# Patient Record
Sex: MALE | Race: WHITE | NOT HISPANIC OR LATINO
[De-identification: names, ages, dates, MRNs, and addresses within clinical notes are randomized per-mention and may not be internally consistent; named-entity substitution may affect disease eponyms.]

---

## 2017-03-16 ENCOUNTER — APPOINTMENT (OUTPATIENT)
Dept: ELECTROPHYSIOLOGY | Facility: CLINIC | Age: 82
End: 2017-03-16

## 2017-06-20 ENCOUNTER — APPOINTMENT (OUTPATIENT)
Dept: ELECTROPHYSIOLOGY | Facility: CLINIC | Age: 82
End: 2017-06-20
Payer: MEDICARE

## 2017-06-20 PROCEDURE — 93280 PM DEVICE PROGR EVAL DUAL: CPT

## 2017-06-20 RX ORDER — AMLODIPINE BESYLATE 5 MG/1
5 TABLET ORAL
Refills: 0 | Status: ACTIVE | COMMUNITY

## 2017-09-21 ENCOUNTER — APPOINTMENT (OUTPATIENT)
Dept: ELECTROPHYSIOLOGY | Facility: CLINIC | Age: 82
End: 2017-09-21
Payer: MEDICARE

## 2017-09-21 PROCEDURE — 93296 REM INTERROG EVL PM/IDS: CPT

## 2017-09-21 PROCEDURE — 93294 REM INTERROG EVL PM/LDLS PM: CPT

## 2017-12-12 ENCOUNTER — APPOINTMENT (OUTPATIENT)
Dept: ELECTROPHYSIOLOGY | Facility: CLINIC | Age: 82
End: 2017-12-12
Payer: MEDICARE

## 2017-12-12 VITALS — DIASTOLIC BLOOD PRESSURE: 75 MMHG | SYSTOLIC BLOOD PRESSURE: 155 MMHG | HEART RATE: 60 BPM

## 2017-12-12 PROCEDURE — 93280 PM DEVICE PROGR EVAL DUAL: CPT

## 2018-03-27 ENCOUNTER — APPOINTMENT (OUTPATIENT)
Dept: ELECTROPHYSIOLOGY | Facility: CLINIC | Age: 83
End: 2018-03-27
Payer: MEDICARE

## 2018-03-27 PROCEDURE — 93296 REM INTERROG EVL PM/IDS: CPT

## 2018-03-27 PROCEDURE — 93294 REM INTERROG EVL PM/LDLS PM: CPT

## 2018-07-12 ENCOUNTER — APPOINTMENT (OUTPATIENT)
Dept: ELECTROPHYSIOLOGY | Facility: CLINIC | Age: 83
End: 2018-07-12
Payer: MEDICARE

## 2018-07-12 PROCEDURE — 93296 REM INTERROG EVL PM/IDS: CPT

## 2018-07-12 PROCEDURE — 93294 REM INTERROG EVL PM/LDLS PM: CPT

## 2018-11-13 ENCOUNTER — APPOINTMENT (OUTPATIENT)
Dept: ELECTROPHYSIOLOGY | Facility: CLINIC | Age: 83
End: 2018-11-13
Payer: MEDICARE

## 2018-11-13 PROCEDURE — 93280 PM DEVICE PROGR EVAL DUAL: CPT

## 2018-11-13 RX ORDER — MELOXICAM 15 MG/1
15 TABLET ORAL
Refills: 0 | Status: DISCONTINUED | COMMUNITY
End: 2018-11-13

## 2018-11-13 RX ORDER — TAMSULOSIN HYDROCHLORIDE 0.4 MG/1
0.4 CAPSULE ORAL
Refills: 0 | Status: ACTIVE | COMMUNITY

## 2019-02-21 ENCOUNTER — APPOINTMENT (OUTPATIENT)
Dept: ELECTROPHYSIOLOGY | Facility: CLINIC | Age: 84
End: 2019-02-21
Payer: MEDICARE

## 2019-02-21 DIAGNOSIS — I42.9 CARDIOMYOPATHY, UNSPECIFIED: ICD-10-CM

## 2019-02-21 PROCEDURE — 93296 REM INTERROG EVL PM/IDS: CPT

## 2019-02-21 PROCEDURE — 93294 REM INTERROG EVL PM/LDLS PM: CPT

## 2019-05-23 ENCOUNTER — APPOINTMENT (OUTPATIENT)
Dept: ELECTROPHYSIOLOGY | Facility: CLINIC | Age: 84
End: 2019-05-23
Payer: MEDICARE

## 2019-05-23 PROCEDURE — 93294 REM INTERROG EVL PM/LDLS PM: CPT

## 2019-05-23 PROCEDURE — 93296 REM INTERROG EVL PM/IDS: CPT

## 2019-07-17 ENCOUNTER — EMERGENCY (EMERGENCY)
Facility: HOSPITAL | Age: 84
LOS: 1 days | Discharge: HOME | End: 2019-07-17
Attending: EMERGENCY MEDICINE | Admitting: EMERGENCY MEDICINE
Payer: MEDICARE

## 2019-07-17 VITALS
RESPIRATION RATE: 18 BRPM | DIASTOLIC BLOOD PRESSURE: 62 MMHG | HEART RATE: 82 BPM | TEMPERATURE: 98 F | SYSTOLIC BLOOD PRESSURE: 165 MMHG

## 2019-07-17 VITALS
DIASTOLIC BLOOD PRESSURE: 109 MMHG | HEART RATE: 74 BPM | OXYGEN SATURATION: 100 % | RESPIRATION RATE: 17 BRPM | SYSTOLIC BLOOD PRESSURE: 154 MMHG

## 2019-07-17 DIAGNOSIS — Z95.0 PRESENCE OF CARDIAC PACEMAKER: Chronic | ICD-10-CM

## 2019-07-17 DIAGNOSIS — H26.491 OTHER SECONDARY CATARACT, RIGHT EYE: Chronic | ICD-10-CM

## 2019-07-17 PROCEDURE — 99284 EMERGENCY DEPT VISIT MOD MDM: CPT | Mod: GC

## 2019-07-17 NOTE — ED PROVIDER NOTE - NSFOLLOWUPINSTRUCTIONS_ED_ALL_ED_FT
1. TAKE ALL MEDICATIONS AS DIRECTED.    2. FOR PAIN OR FEVER YOU CAN TAKE IBUPROFEN (MOTRIN, ADVIL) OR ACETAMINOPHEN (TYLENOL) AS NEEDED, AS DIRECTED ON PACKAGING.  3. FOLLOW UP WITH YOUR UROLOGIST WITHIN 2 DAYS AS DIRECTED.  4. IF YOU HAD LABS OR IMAGING DONE, YOU WERE GIVEN COPIES OF ALL LABS AND/OR IMAGING RESULTS FROM YOUR ER VISIT--PLEASE TAKE THEM WITH YOU TO YOUR FOLLOW UP APPOINTMENTS.  5. IF NEEDED, CALL PATIENT ACCESS SERVICES AT [phone] TO FIND A PRIMARY CARE PHYSICIAN.  OR CALL [phone] TO MAKE AN APPOINTMENT WITH THE CLINIC.  6. RETURN TO THE ER FOR ANY WORSENING SYMPTOMS OR CONCERNS.

## 2019-07-17 NOTE — ED ADULT NURSE NOTE - OBJECTIVE STATEMENT
pt a&Ox3, had a urolift transprostatic implant treatment today at 2pm & has not been able to urinate since. pt breathing even & unlabored, VSS, offers no other medical complaints. abd soft, non tender, distended, bladder distended. 16 fr cudet loving placed draining 300 ml dark clear urine. pt expressed immediate relief post insertion, will continue to monitor.

## 2019-07-17 NOTE — ED PROVIDER NOTE - PHYSICAL EXAMINATION
Physical Exam:  Gen: NAD, AOx3, non-toxic appearing, able to ambulate without assistance  Head: NCAT  HEENT: EOMI, PEERLA, normal conjunctiva, tongue midline, oral mucosa moist  Lung: CTAB, no respiratory distress, no wheezes/rhonchi/rales B/L, speaking in full sentences  CV: RRR, no murmurs, rubs or gallops  Abd: soft, NT, ND, no guarding, no rigidity, no rebound tenderness, no CVA tenderness, + suprapubic distention / mild ttp  : normal exam without rashes or lesions   MSK: no visible deformities, ROM normal in UE/LE, no back pain  Neuro: No focal sensory or motor deficits  Skin: Warm, well perfused, no rash, no leg swelling  Psych: normal affect, calm  ~Ranulfo Clarke D.O. -Resident

## 2019-07-17 NOTE — ED PROVIDER NOTE - ATTENDING CONTRIBUTION TO CARE
agree with resident note  "85yo m pmhx of pacemaker, glaucoma sx, hypothyroidism, HLD, arthritis pw urinary retention since 2pm today. reports having prostate uro lift with dr anaya today in [place]. reports not having urinated fully since, had some dribbles but no full urine stream. reports suprapubic distention, denies dysuria."    PE: uncomfortable in pain; VSS: CTAB/L; s1 s2 no m/r/g abd: distended in suprapubic region    imp: urinary retention; place loving; check UA; likely d/c

## 2019-07-17 NOTE — ED PROVIDER NOTE - NS ED ROS FT
ROS:  GENERAL: No fever, no chills  EYES: no change in vision  HEENT: no trouble swallowing, no trouble speaking  CARDIAC: no chest pain  PULMONARY: no cough, no shortness of breath  GI: no abdominal pain, no nausea, no vomiting, no diarrhea, no constipation  : decreased freq of urination, + suprapubic distention  SKIN: no rashes  NEURO: no headache, no weakness  MSK: No joint pain  ~Ranulfo Clarke D.O. -Resident

## 2019-07-17 NOTE — ED PROVIDER NOTE - OBJECTIVE STATEMENT
85yo m pmhx of pacemaker, glaucoma sx, hypothyroidism, HLD, arthritis pw urinary retention since 2pm today. reports having prostate uro lift with dr anaya today in [place]. reports not having urinated fully since, had some dribbles but no full urine stream. reports suprapubic distention, denies dysuria. Denies recent trauma, fevers, chills, headache, dizziness, nausea, vomiting, dysuria, freq, hematuria, diarrhea, constipation, chest pain, shortness of breath, cough.    information packet from dr anaya states to place coude incase of retention

## 2019-07-17 NOTE — ED PROVIDER NOTE - CLINICAL SUMMARY MEDICAL DECISION MAKING FREE TEXT BOX
85yo m pmhx of pacemaker, glaucoma sx, hypothyroidism, HLD, arthritis pw urinary retention since 2pm today. reports having prostate uro lift with dr anaya today in [place], bedside sono 350ml in bladder, will place augusto loving with followup with Dr. anaya uro, no fevers and retention acute less concerning for need for labs or urine studies, return precautions

## 2019-07-17 NOTE — ED ADULT NURSE REASSESSMENT NOTE - NS ED NURSE REASSESS COMMENT FT1
pt a & Ox3, breathing even & unlabored, loving bag changed to leg bag, pt educated with teach back on how to empty leg back, first emptying completed by pt. pt to be discharged home, will continue to monitor.

## 2019-07-17 NOTE — ED ADULT NURSE NOTE - NSIMPLEMENTINTERV_GEN_ALL_ED
Implemented All Universal Safety Interventions:  [place] to call system. Call bell, personal items and telephone within reach. Instruct patient to call for assistance. Room bathroom lighting operational. Non-slip footwear when patient is off stretcher. Physically safe environment: no spills, clutter or unnecessary equipment. Stretcher in lowest position, wheels locked, appropriate side rails in place.

## 2019-07-17 NOTE — ED PROVIDER NOTE - PROGRESS NOTE DETAILS
Patient feels well, tolerating PO. Discussed findings with patient. Patient feels comfortable going home. Gave home care and follow up instructions. Discussed which symptoms to look out for and when to return to the ED for further evaluation. Patient given opportunity to ask questions about their medical condition and had all questions answered.

## 2019-08-26 ENCOUNTER — APPOINTMENT (OUTPATIENT)
Dept: ELECTROPHYSIOLOGY | Facility: CLINIC | Age: 84
End: 2019-08-26
Payer: MEDICARE

## 2019-08-26 PROCEDURE — 93294 REM INTERROG EVL PM/LDLS PM: CPT

## 2019-08-26 PROCEDURE — 93296 REM INTERROG EVL PM/IDS: CPT

## 2019-10-09 ENCOUNTER — APPOINTMENT (OUTPATIENT)
Dept: ELECTROPHYSIOLOGY | Facility: CLINIC | Age: 84
End: 2019-10-09
Payer: MEDICARE

## 2019-10-09 PROCEDURE — 93280 PM DEVICE PROGR EVAL DUAL: CPT

## 2019-10-09 RX ORDER — ENALAPRIL MALEATE 5 MG/1
5 TABLET ORAL DAILY
Refills: 0 | Status: DISCONTINUED | COMMUNITY
End: 2019-10-09

## 2019-10-09 RX ORDER — LOSARTAN POTASSIUM 50 MG/1
50 TABLET, FILM COATED ORAL
Refills: 0 | Status: ACTIVE | COMMUNITY

## 2020-01-09 ENCOUNTER — APPOINTMENT (OUTPATIENT)
Dept: ELECTROPHYSIOLOGY | Facility: CLINIC | Age: 85
End: 2020-01-09
Payer: MEDICARE

## 2020-01-09 PROCEDURE — 93296 REM INTERROG EVL PM/IDS: CPT

## 2020-01-09 PROCEDURE — 93294 REM INTERROG EVL PM/LDLS PM: CPT

## 2020-04-09 ENCOUNTER — APPOINTMENT (OUTPATIENT)
Dept: ELECTROPHYSIOLOGY | Facility: CLINIC | Age: 85
End: 2020-04-09
Payer: MEDICARE

## 2020-04-09 PROCEDURE — 93294 REM INTERROG EVL PM/LDLS PM: CPT

## 2020-04-09 PROCEDURE — 93296 REM INTERROG EVL PM/IDS: CPT

## 2020-07-28 ENCOUNTER — APPOINTMENT (OUTPATIENT)
Dept: ELECTROPHYSIOLOGY | Facility: CLINIC | Age: 85
End: 2020-07-28
Payer: MEDICARE

## 2020-07-28 DIAGNOSIS — E78.5 HYPERLIPIDEMIA, UNSPECIFIED: ICD-10-CM

## 2020-07-28 DIAGNOSIS — I10 ESSENTIAL (PRIMARY) HYPERTENSION: ICD-10-CM

## 2020-07-28 DIAGNOSIS — I49.5 SICK SINUS SYNDROME: ICD-10-CM

## 2020-07-28 DIAGNOSIS — E03.9 HYPOTHYROIDISM, UNSPECIFIED: ICD-10-CM

## 2020-07-28 PROCEDURE — 93280 PM DEVICE PROGR EVAL DUAL: CPT

## 2020-10-08 ENCOUNTER — APPOINTMENT (OUTPATIENT)
Dept: ELECTROPHYSIOLOGY | Facility: CLINIC | Age: 85
End: 2020-10-08
Payer: MEDICARE

## 2020-10-08 PROCEDURE — 93296 REM INTERROG EVL PM/IDS: CPT

## 2020-10-08 PROCEDURE — 93294 REM INTERROG EVL PM/LDLS PM: CPT

## 2020-10-20 DIAGNOSIS — I47.2 VENTRICULAR TACHYCARDIA: ICD-10-CM

## 2020-11-11 ENCOUNTER — RX CHANGE (OUTPATIENT)
Age: 85
End: 2020-11-11

## 2020-11-11 RX ORDER — METOPROLOL SUCCINATE 25 MG/1
25 TABLET, EXTENDED RELEASE ORAL
Qty: 90 | Refills: 1 | Status: ACTIVE | COMMUNITY
Start: 2020-11-11 | End: 1900-01-01

## 2020-11-11 RX ORDER — METOPROLOL SUCCINATE 25 MG/1
25 TABLET, EXTENDED RELEASE ORAL
Qty: 30 | Refills: 2 | Status: DISCONTINUED | COMMUNITY
Start: 2020-10-20 | End: 2020-11-11

## 2021-01-19 ENCOUNTER — APPOINTMENT (OUTPATIENT)
Dept: ELECTROPHYSIOLOGY | Facility: CLINIC | Age: 86
End: 2021-01-19
Payer: SELF-PAY

## 2021-01-19 PROCEDURE — 93296 REM INTERROG EVL PM/IDS: CPT

## 2021-01-19 PROCEDURE — 93294 REM INTERROG EVL PM/LDLS PM: CPT

## 2021-04-22 ENCOUNTER — APPOINTMENT (OUTPATIENT)
Dept: ELECTROPHYSIOLOGY | Facility: CLINIC | Age: 86
End: 2021-04-22

## 2021-06-14 ENCOUNTER — FORM ENCOUNTER (OUTPATIENT)
Age: 86
End: 2021-06-14